# Patient Record
Sex: FEMALE | Race: WHITE
[De-identification: names, ages, dates, MRNs, and addresses within clinical notes are randomized per-mention and may not be internally consistent; named-entity substitution may affect disease eponyms.]

---

## 2020-09-06 ENCOUNTER — HOSPITAL ENCOUNTER (OUTPATIENT)
Dept: HOSPITAL 92 - L&D/OP | Age: 30
Discharge: HOME | End: 2020-09-06
Attending: OBSTETRICS & GYNECOLOGY
Payer: COMMERCIAL

## 2020-09-06 VITALS — BODY MASS INDEX: 49.7 KG/M2

## 2020-09-06 DIAGNOSIS — Z3A.34: ICD-10-CM

## 2020-09-06 DIAGNOSIS — R03.0: ICD-10-CM

## 2020-09-06 DIAGNOSIS — O99.89: Primary | ICD-10-CM

## 2020-09-06 DIAGNOSIS — O99.213: ICD-10-CM

## 2020-09-06 DIAGNOSIS — E66.01: ICD-10-CM

## 2020-09-06 PROCEDURE — 99282 EMERGENCY DEPT VISIT SF MDM: CPT

## 2020-09-06 NOTE — PRG
DATE OF SERVICE:  2020



TIME OF SERVICE:  7:45 p.m.



PRESENTING COMPLAINT:  Elevated blood pressures at home at 34 weeks' gestation.



HISTORY OF PRESENT ILLNESS:  Ms. Villalba is a 30-year-old  1, para 0, at 34 and

1 with morbid obesity.  She has a history of hypertension outside of pregnancy, was

not on medicine at the beginning of pregnancy.  She has had normal blood pressures

throughout pregnancy.  She reports she had 97 diastolic, 130 systolic at home, was

told to come to Labor and Delivery for evaluation.  She has had a mild headache.  No

scotoma.  No right upper quadrant pain. 



OB/GYN HISTORY:  As noted A positive, antibody negative.  Pap negative.  Rubella

immune.  VDRL nonreactive.  Hepatitis B, GC, chlamydia negative. 



PAST MEDICAL HISTORY:  Significant for distant past history of hypertension, but off

medications with no hypertension recently and morbid obesity.  The patient has a BMI

of almost 50. 



SURGICAL HISTORY:  Denies.



ALLERGIES:  DENIES.



MEDICATIONS:  Prenatal vitamins.



SOCIAL HISTORY:  Denies tobacco, alcohol, or IV drug use.



FAMILY HISTORY:  Noncontributory.



REVIEW OF SYSTEMS:  Noncontributory.



PHYSICAL EXAMINATION:

GENERAL:  White female, in no acute distress. 

VITAL SIGNS:  Blood pressure 128/72 with appropriate size cuff, pulse 85,

respirations 18, temperature 98.2. 

HEENT:  Within normal limits. 

LUNGS:  Clear to auscultation bilaterally. 

HEART:  Regular rhythm. 

ABDOMEN:  Soft, nontender.  Fundal height 34.  FHTs 150s.  Vulva without lesions.

Vaginal exam deferred. 

EXTREMITIES:  The patient has trace edema.  She has 1+ DTRs.  No clonus. 



Serial blood pressures remained 130 systolics or below with a category 1 fetal heart

rate tracing on monitoring. 



IMPRESSION:  Morbid obesity.  No evidence of preeclampsia or gestational

hypertension at 34 weeks' gestation. 



PLAN:  Reassurance.  Instructions regarding appropriate size blood pressure cuffs

were given to the patient.  She will be discharged home.  Keep scheduled followup in

4 days with Dr. Cowan. 







Job ID:  [id number]
Shai Barnett(Resident)

## 2020-10-08 ENCOUNTER — HOSPITAL ENCOUNTER (OUTPATIENT)
Dept: HOSPITAL 92 - LABBT | Age: 30
Discharge: HOME | End: 2020-10-08
Attending: OBSTETRICS & GYNECOLOGY
Payer: COMMERCIAL

## 2020-10-08 DIAGNOSIS — Z20.828: Primary | ICD-10-CM

## 2020-10-08 PROCEDURE — 87635 SARS-COV-2 COVID-19 AMP PRB: CPT

## 2020-10-08 PROCEDURE — U0003 INFECTIOUS AGENT DETECTION BY NUCLEIC ACID (DNA OR RNA); SEVERE ACUTE RESPIRATORY SYNDROME CORONAVIRUS 2 (SARS-COV-2) (CORONAVIRUS DISEASE [COVID-19]), AMPLIFIED PROBE TECHNIQUE, MAKING USE OF HIGH THROUGHPUT TECHNOLOGIES AS DESCRIBED BY CMS-2020-01-R: HCPCS

## 2020-10-11 ENCOUNTER — HOSPITAL ENCOUNTER (INPATIENT)
Dept: HOSPITAL 92 - L&D | Age: 30
LOS: 5 days | Discharge: HOME | End: 2020-10-16
Attending: OBSTETRICS & GYNECOLOGY | Admitting: OBSTETRICS & GYNECOLOGY
Payer: COMMERCIAL

## 2020-10-11 VITALS — BODY MASS INDEX: 47.5 KG/M2

## 2020-10-11 DIAGNOSIS — E66.01: ICD-10-CM

## 2020-10-11 DIAGNOSIS — Z3A.39: ICD-10-CM

## 2020-10-11 DIAGNOSIS — O61.0: ICD-10-CM

## 2020-10-11 LAB
HBSAG INDEX: 0.15 S/CO (ref 0–0.99)
HGB BLD-MCNC: 11.8 G/DL (ref 12–16)
MCH RBC QN AUTO: 31.9 PG (ref 27–31)
MCV RBC AUTO: 91.9 FL (ref 78–98)
PLATELET # BLD AUTO: 187 THOU/UL (ref 130–400)
RBC # BLD AUTO: 3.69 MILL/UL (ref 4.2–5.4)
SYPHILIS ANTIBODY INDEX: 0.02 S/CO
WBC # BLD AUTO: 8.2 THOU/UL (ref 4.8–10.8)

## 2020-10-11 PROCEDURE — 85018 HEMOGLOBIN: CPT

## 2020-10-11 PROCEDURE — S0020 INJECTION, BUPIVICAINE HYDRO: HCPCS

## 2020-10-11 PROCEDURE — U0003 INFECTIOUS AGENT DETECTION BY NUCLEIC ACID (DNA OR RNA); SEVERE ACUTE RESPIRATORY SYNDROME CORONAVIRUS 2 (SARS-COV-2) (CORONAVIRUS DISEASE [COVID-19]), AMPLIFIED PROBE TECHNIQUE, MAKING USE OF HIGH THROUGHPUT TECHNOLOGIES AS DESCRIBED BY CMS-2020-01-R: HCPCS

## 2020-10-11 PROCEDURE — 86901 BLOOD TYPING SEROLOGIC RH(D): CPT

## 2020-10-11 PROCEDURE — 86900 BLOOD TYPING SEROLOGIC ABO: CPT

## 2020-10-11 PROCEDURE — 85014 HEMATOCRIT: CPT

## 2020-10-11 PROCEDURE — 85027 COMPLETE CBC AUTOMATED: CPT

## 2020-10-11 PROCEDURE — 87635 SARS-COV-2 COVID-19 AMP PRB: CPT

## 2020-10-11 PROCEDURE — 86850 RBC ANTIBODY SCREEN: CPT

## 2020-10-11 PROCEDURE — 87340 HEPATITIS B SURFACE AG IA: CPT

## 2020-10-11 PROCEDURE — 36416 COLLJ CAPILLARY BLOOD SPEC: CPT

## 2020-10-11 PROCEDURE — 51702 INSERT TEMP BLADDER CATH: CPT

## 2020-10-11 PROCEDURE — 36415 COLL VENOUS BLD VENIPUNCTURE: CPT

## 2020-10-11 PROCEDURE — 86780 TREPONEMA PALLIDUM: CPT

## 2020-10-12 RX ADMIN — SODIUM CHLORIDE SCH: 0.9 INJECTION, SOLUTION INTRAVENOUS at 19:27

## 2020-10-12 NOTE — PDOC.LDHP
Labor and Delivery H&P


Chief complaint: scheduled induction (CHTN), other


HPI: 





Pt here for IOL for CHTN, off meds.


Current gestational age (weeks): 39


Due date: 10/17/20


Dating criteria: first trimester ultrasound


Grav: 1


Para: 0


Current pregnancy complications: gestational diabetes (diet controlled)


Abnormal US findings: No


Current medications: pre- vitamins


Previous surgical history: none


Allergies/Adverse Reactions: 


                                    Allergies











Allergy/AdvReac Type Severity Reaction Status Date / Time


 


cinnamon Allergy  Swollen Verified 10/11/20 19:23





   Lips  


 


coconut Allergy  Rash Verified 10/11/20 19:23











Social history: none





- Physical Exam


Vital signs reviewed and normal: yes


General: NAD


Heart: RRR


Lungs: CTAB


Abdomen: gravid


Extremeties: no edema


FHT: category 1





- Vaginal Exam


cm dilated: 0


Effacement: 50%


Station: -3





- OB Labs


Blood type: A


RH: positive


Antibody Screen: negative


HIV: negative


RPR: negative


HEPSAg: negative


1 hour GCT: positive


3 hour GTT: positive 


GBS: negative


Urine drug screen: negative


Rubella: immune





- Assessment


L&D Assessment: medically indicated induction





- Plan


Plan: admit to L&D, cervical ripening, labor augmentation if indicated, informed

consent obtained, anesthesia consult for pain management


-: 





IOL for GHTN/A1GDM, sp 2 doses of cytotec, 3rd dose ordered.

## 2020-10-12 NOTE — PDOC.LDPN
Labor & Delivery Progress Note





- Subjective


Subjective: other (cramping)





- Objective


Vital signs reviewed and normal: yes


General: resting


Dilation: 1


Effacement: 50%


Station: -3


FHT: category 1


Other exam findings: Cook balloon placed digitally. 





- Assessment


(1) Chronic hypertension


Code(s): I10 - ESSENTIAL (PRIMARY) HYPERTENSION   Current Visit: Yes   Status: 

Acute   





(2) 39 weeks gestation of pregnancy


Code(s): Z3A.39 - 39 WEEKS GESTATION OF PREGNANCY   Current Visit: Yes   Status:

Acute   





(3) Gestational diabetes


Code(s): O24.419 - GESTATIONAL DIABETES MELLITUS IN PREGNANCY, UNSP CONTROL   

Current Visit: Yes   Status: Acute   


Plan: other


-: 





Cook balloon placed after 3rd dose of cytotec, now 1cm.  Will consider low dose 

pitocin with Cook balloon in place later today after epidural.

## 2020-10-12 NOTE — PDOC.LDPN
Labor & Delivery Progress Note





- Subjective


Subjective: comfortable, painful contractions





- Objective


Vital signs reviewed and normal: yes


General: breathing through contractions





- Assessment


(1) Chronic hypertension


Code(s): I10 - ESSENTIAL (PRIMARY) HYPERTENSION   Current Visit: Yes   Status: 

Acute   





(2) 39 weeks gestation of pregnancy


Code(s): Z3A.39 - 39 WEEKS GESTATION OF PREGNANCY   Current Visit: Yes   Status:

Acute   





(3) Gestational diabetes


Code(s): O24.419 - GESTATIONAL DIABETES MELLITUS IN PREGNANCY, UNSP CONTROL   

Current Visit: Yes   Status: Acute   


Plan: continue plan of care


-: 





A/P: IOL w Cook Balloon in place.  Plan for AROM and pitocin when removed at 12 

hours.

## 2020-10-12 NOTE — PDOC.BPN
- Brief Progress Note


Encounter Date: 10/12/20


Encounter Time: 23:13





Balloon out.  SVE 4/70/-3, ballottable.  AROM not performed at this time.  Will 

start pitocin.

## 2020-10-13 LAB — HGB BLD-MCNC: 11.4 G/DL (ref 12–16)

## 2020-10-13 RX ADMIN — Medication PRN MLS: at 16:46

## 2020-10-13 RX ADMIN — Medication PRN MLS: at 16:47

## 2020-10-13 RX ADMIN — DOCUSATE CALCIUM SCH: 240 CAPSULE, LIQUID FILLED ORAL at 23:36

## 2020-10-13 RX ADMIN — TRANEXAMIC ACID SCH: 100 INJECTION INTRAVENOUS at 21:48

## 2020-10-13 RX ADMIN — SODIUM CHLORIDE SCH: 0.9 INJECTION, SOLUTION INTRAVENOUS at 03:08

## 2020-10-13 NOTE — PDOC.OPDEL
OB Operative/Delivery Note


Delivery Dr/Surgeon: SOUTH


Assist: Ama


Pre-Delivery Diagnosis: medically indicated induction (CHTN, A1DM, obesity)


Procedure/Post Delivery Dx: primary low transverse CS (for failed IOL @ 5cm)


Anesthesia: epidural





- Findings


  ** A


Sex: female





- Additional Findings/Plan


Placenta delivered: manual removal


 findings: low transverse hysterotomy without extension


Estimated blood loss: 1500ml


Compilations/Other Findings: 





uterine atony during the case that resolved w pitocin and massage, uterine atony

and PPH noted at the end of the case-> resolved w TXA 1gm, hemabate (concern 

that it was not give IM, nurse did not placed deeply into thigh) and cytotec 1gm

MO.  


Post delivery plan: routine recovery (w H and H ordered)

## 2020-10-13 NOTE — PDOC.LDPN
Labor & Delivery Progress Note





- Subjective


Subjective: comfortable





- Objective


Vital signs reviewed and normal: yes


General: resting


Dilation: 5


Effacement: 50%


Station: -2


FHT: category 1


AROM: clear fluid


IUPC placed: yes


FSE placed: yes





- Assessment


(1) Chronic hypertension


Code(s): I10 - ESSENTIAL (PRIMARY) HYPERTENSION   Current Visit: Yes   Status: 

Acute   





(2) 39 weeks gestation of pregnancy


Code(s): Z3A.39 - 39 WEEKS GESTATION OF PREGNANCY   Current Visit: Yes   Status:

Acute   





(3) Gestational diabetes


Code(s): O24.419 - GESTATIONAL DIABETES MELLITUS IN PREGNANCY, UNSP CONTROL   

Current Visit: Yes   Status: Acute   


Plan: continue plan of care


-: 





SP AROM w IUP and FSE placed.  Plan of care reviewed.

## 2020-10-13 NOTE — PDOC.EVN
Event Note





- Event Note


Event Note: 





 unchnaged since Cook Balloon removed approx 15 hours ago, despite AROM and 

pitocin this AM.  Plan to OR for 1 CS.

## 2020-10-13 NOTE — PDOC.LDPN
Labor & Delivery Progress Note





- Subjective


Subjective: comfortable





- Objective


Vital signs reviewed and normal: yes


General: resting


Dilation: 5


Effacement: 75%


Station: -2


FHT: category 1





- Assessment


(1) Chronic hypertension


Code(s): I10 - ESSENTIAL (PRIMARY) HYPERTENSION   Current Visit: Yes   Status: 

Acute   





(2) 39 weeks gestation of pregnancy


Code(s): Z3A.39 - 39 WEEKS GESTATION OF PREGNANCY   Current Visit: Yes   Status:

Acute   





(3) Gestational diabetes


Code(s): O24.419 - GESTATIONAL DIABETES MELLITUS IN PREGNANCY, UNSP CONTROL   

Current Visit: Yes   Status: Acute   


Plan: continue plan of care


-: 





Discussed recheck SVE 3 hrs if unchanged plan for 1CS.

## 2020-10-13 NOTE — OP
DATE OF PROCEDURE:  10/13/2020



PREOPERATIVE DIAGNOSES:  

1. Pregnancy at 39 weeks.

2. Gestational diabetes, diet controlled.

3. History of chronic hypertension, off medications.

4. Morbid obesity.

5. Failed induction with arrest of dilation at 5 cm.



POSTOPERATIVE DIAGNOSES:  

1. Pregnancy at 39 weeks.

2. Gestational diabetes, diet controlled.

3. History of chronic hypertension, off medications.

4. Morbid obesity.

5. Failed induction with arrest of dilation at 5 cm.



PROCEDURE PERFORMED:  Primary low-transverse  section.



ASSISTANT:  Baldemar Serrato MD



ANESTHESIA:  Dr. Bermudez, with epidural previously placed.



COMPLICATIONS:  None.



ESTIMATED BLOOD LOSS:  1500 mL total from delivery and postpartum hemorrhage.



FINDINGS:  

1. Low-transverse hysterotomy without extension.

2. Female infant, Apgars and weight pending at the time of dictation, delivered from

an asynclitic lie without difficulty. 

3. Normal-appearing uterus, tubes, and ovaries bilaterally.

4. Uterine atony relieved with IV Pitocin, fundal massage, 1 g of Lysteda IV, and 1

g of Cytotec per rectum. 

5. Surgical sites hemostatic.



PROCEDURE IN DETAIL:  Patient was taken back to the OR with IV fluids running and

epidural and Zelaya catheter that were previously placed.  When she was in the OR,

she was placed in dorsal supine position with a left lateral tilt.  The abdomen was

prepped and draped in a normal fashion for  section.  She received

azithromycin and Ancef IV for surgical prophylaxis.  A Pfannenstiel skin incision

was made with a scalpel.  The skin incision was carried down through subcutaneous

layer.  Once the fascia was reached, it was incised in the midline, extended

superolaterally using curved Patrick scissors.  Kocher clamps placed at the superior

border of the fascia, which was sharply and bluntly dissected off the rectus

abdominis muscles.  In a similar fashion, the Kocher clamps were placed at the

inferior border of the fascia, which was dissected down towards the pubic symphysis.

 The rectus muscles were bluntly  in the midline.  The peritoneum was

bluntly entered and stretched laterally.  An Janusz O retractor was placed into the

peritoneal cavity for retraction, visualization, and protection of the wound.  A

bladder flap was created using Metzenbaum scissors and the bladder was dissected

away from the planned hysterotomy site.  A low-transverse hysterotomy was made with

a scalpel.  The hysterotomy was bluntly stretched.  The infant was delivered with a

noted asynclitic  lie through the hysterotomy without difficulty followed by the

body and extremities.  The nose and mouth were suctioned.  The cord was doubly

clamped and cut and the infant was handed off to special care nurses in attendance.

Cord blood was collected.  The placenta was delivered.  The uterus was exteriorized,

massaged to firm and cleared of clot and debris.  The uterus was re-placed into the

abdominal cavity.  The hysterotomy was inspected and no extension was noted.  The

hysterotomy was reapproximated in a running locked stitch with Monocryl suture.

After the hysterotomy was closed, it was inspected and no areas of bleeding were

noted.  Uterine atony was noted and additional 20 units of IM Pitocin for a total of

40 were added to the patient's IV bag.  The fascia and muscle were inspected with no

areas of bleeding noted.  The uterus at this time massaged to firm.  The rectus

fascia was reapproximated from corner to corner and tied separately in the midline.

The subcutaneous tissue was irrigated and dried.  Any small areas of bleeding were

controlled with bipolar cauterization.  The subcutaneous layer was reapproximated

with plain gut suture.  The skin was closed with 4-0 Monocryl and a LILY dressing

was applied as a sterile dressing over the incision for negative pressure

prophylactic wound dressing.  After the dressing was applied, the uterine fundus was

massaged and a significant amount of clot was immediately expressed from the uterus

approximately 500 additional mL in addition to surgical loss.  At this time, a

sterile gloved hand was used to manually sweep and remove large clots from the

vagina and lower uterine segment.  Hemabate and Lysteda were ordered.  Lysteda was

given IV.  Hemabate was injected.  However, on watching the injection giving the

needle most likely did not penetrate the muscle and was instead given in the

subcutaneous layer.  For this reason, a gram of Cytotec was ordered and placed per

rectum.  The uterus did massage to firm after the clots were removed.  For the

approximate next 5 minutes, we observed for any abnormal bleeding and no further

abnormal bleeding was noted.  Patient was then cleaned, dried, and transferred to a

new bed and transferred to Recovery in good condition.  Hemoglobin has been ordered

for assessment of her bleeding after the postpartum hemorrhage for this evening.

The patient tolerated the procedure well.  The counts were correct. 







Job ID:  [id number]

## 2020-10-14 LAB
HGB BLD-MCNC: 9.8 G/DL (ref 12–16)
MCH RBC QN AUTO: 31.2 PG (ref 27–31)
MCV RBC AUTO: 91.6 FL (ref 78–98)
PLATELET # BLD AUTO: 178 THOU/UL (ref 130–400)
RBC # BLD AUTO: 3.15 MILL/UL (ref 4.2–5.4)
WBC # BLD AUTO: 12.7 THOU/UL (ref 4.8–10.8)

## 2020-10-14 RX ADMIN — DOCUSATE CALCIUM SCH MG: 240 CAPSULE, LIQUID FILLED ORAL at 08:09

## 2020-10-14 RX ADMIN — DOCUSATE CALCIUM SCH MG: 240 CAPSULE, LIQUID FILLED ORAL at 21:11

## 2020-10-14 NOTE — PDOC.PP
Post Partum Progress Note


Post Partum Day #: 1


Subjective: 





Doing well, denies pain, no NV. 


PO intake tolerated: yes


Flatus: yes


Ambulation: yes


                             Vital Signs (12 hours)











  Temp Pulse Resp BP Pulse Ox


 


 10/14/20 08:12  97.6 F  69  20  128/85  99


 


 10/14/20 04:10  97.9 F  71  14  131/70 


 


 10/14/20 00:10  98.3 F  78  15  130/72 


 


 10/13/20 22:00  97.7 F  77  16  134/80 


 


 10/13/20 21:00  98.1 F  92  16  135/84 








                                     Weight











Weight                         268 lb

















- Physical Examination


General: NAD


Respiratory: non-labored breathing


Abdominal: no distention


Fundus firm & at: below umb


Skin: CS incision dry & intact (bhupendra dressing intact)


Psychiatric: A&Ox3, normal affect


Result Diagrams: 


                                 10/14/20 05:39





Additional Labs: 


                                Post Partum Labs











Hep Bs Antigen  Non-Reactive S/CO (NonReactive)   10/11/20  20:08    


 


Blood Type  A POSITIVE   10/11/20  21:15    











(1) Chronic hypertension


Code(s): I10 - ESSENTIAL (PRIMARY) HYPERTENSION   Status: Acute   





(2) 39 weeks gestation of pregnancy


Code(s): Z3A.39 - 39 WEEKS GESTATION OF PREGNANCY   Status: Acute   





(3) Gestational diabetes


Code(s): O24.419 - GESTATIONAL DIABETES MELLITUS IN PREGNANCY, UNSP CONTROL   

Status: Acute   





- Assessment/Plan


Doing well POD1, plan to remove ashley and ambulate this AM.  Discussed anemia 

and plan to start iron.

## 2020-10-15 RX ADMIN — HYDROCODONE BITARTRATE AND ACETAMINOPHEN PRN TAB: 5; 325 TABLET ORAL at 19:40

## 2020-10-15 RX ADMIN — DOCUSATE CALCIUM SCH MG: 240 CAPSULE, LIQUID FILLED ORAL at 21:55

## 2020-10-15 RX ADMIN — HYDROCODONE BITARTRATE AND ACETAMINOPHEN PRN TAB: 5; 325 TABLET ORAL at 14:18

## 2020-10-15 RX ADMIN — HYDROCODONE BITARTRATE AND ACETAMINOPHEN PRN TAB: 5; 325 TABLET ORAL at 05:31

## 2020-10-15 RX ADMIN — HYDROCODONE BITARTRATE AND ACETAMINOPHEN PRN TAB: 5; 325 TABLET ORAL at 09:27

## 2020-10-15 RX ADMIN — DOCUSATE CALCIUM SCH MG: 240 CAPSULE, LIQUID FILLED ORAL at 09:26

## 2020-10-15 NOTE — PDOC.PP
Post Partum Progress Note


Post Partum Day #: 2


Subjective: 





doing well, tired from nursing attempts, saw LC yesterday 


PO intake tolerated: yes


Flatus: yes


Ambulation: yes


                             Vital Signs (12 hours)











  Temp Pulse Resp BP Pulse Ox


 


 10/15/20 07:59  97.8 F  85  20  116/77  97


 


 10/15/20 05:30  98.6 F  95  16  131/90 


 


 10/14/20 21:00  98.4 F  95  18  119/59 L  100








                                     Weight











Weight                         268 lb

















- Physical Examination


General: NAD


Respiratory: non-labored breathing


Skin: CS incision dry & intact (bhupendra dressing cdi)


Psychiatric: A&Ox3, normal affect


Result Diagrams: 


                                 10/14/20 05:39





Additional Labs: 


                                Post Partum Labs











Hep Bs Antigen  Non-Reactive S/CO (NonReactive)   10/11/20  20:08    


 


Blood Type  A POSITIVE   10/11/20  21:15    











(1) Chronic hypertension


Code(s): I10 - ESSENTIAL (PRIMARY) HYPERTENSION   Status: Acute   





(2) 39 weeks gestation of pregnancy


Code(s): Z3A.39 - 39 WEEKS GESTATION OF PREGNANCY   Status: Acute   





(3) Gestational diabetes


Code(s): O24.419 - GESTATIONAL DIABETES MELLITUS IN PREGNANCY, UNSP CONTROL   

Status: Acute   





(4) Delivery by  section


Code(s): IXN7140 -    Status: Acute   





- Assessment/Plan


POD2 doing well, healing well and pain controlled, only concerns are nursing, 

possible DC this evening vs. tomorrow, LC pending

## 2020-10-16 VITALS — TEMPERATURE: 98.6 F | SYSTOLIC BLOOD PRESSURE: 130 MMHG | DIASTOLIC BLOOD PRESSURE: 81 MMHG

## 2020-10-16 RX ADMIN — HYDROCODONE BITARTRATE AND ACETAMINOPHEN PRN TAB: 5; 325 TABLET ORAL at 08:27

## 2020-10-16 RX ADMIN — DOCUSATE CALCIUM SCH MG: 240 CAPSULE, LIQUID FILLED ORAL at 08:26

## 2020-10-16 NOTE — PDOC.PP
Post Partum Progress Note


Post Partum Day #: 3


Subjective: 





doing well, no concerns, feeding much better


PO intake tolerated: yes


Flatus: yes


Ambulation: yes


                             Vital Signs (12 hours)











  Temp Pulse Resp BP Pulse Ox


 


 10/16/20 08:07  98.6 F  93  20  130/81  98


 


 10/16/20 04:40  97.9 F  93  20  132/68 


 


 10/16/20 00:30  98.2 F  104 H  18  132/75  98








                                     Weight











Weight                         268 lb

















- Physical Examination


General: NAD


Respiratory: non-labored breathing


Fundus firm & at: below umb


Skin: CS incision dry & intact


Neurological: no gross focal deficits


Psychiatric: A&Ox3, normal affect


Result Diagrams: 


                                 10/14/20 05:39





Additional Labs: 


                                Post Partum Labs











Hep Bs Antigen  Non-Reactive S/CO (NonReactive)   10/11/20  20:08    


 


Blood Type  A POSITIVE   10/11/20  21:15    











(1) Chronic hypertension


Code(s): I10 - ESSENTIAL (PRIMARY) HYPERTENSION   Status: Acute   





(2) 39 weeks gestation of pregnancy


Code(s): Z3A.39 - 39 WEEKS GESTATION OF PREGNANCY   Status: Acute   





(3) Gestational diabetes


Code(s): O24.419 - GESTATIONAL DIABETES MELLITUS IN PREGNANCY, UNSP CONTROL   

Status: Acute   





(4) Delivery by  section


Code(s): MEB9687 -    Status: Acute   





- Assessment/Plan


POD3 doing well, asymptomatic anemia after PPH.  Doing well, plan to continue 

iron and home today.